# Patient Record
Sex: FEMALE | Race: BLACK OR AFRICAN AMERICAN | NOT HISPANIC OR LATINO | URBAN - METROPOLITAN AREA
[De-identification: names, ages, dates, MRNs, and addresses within clinical notes are randomized per-mention and may not be internally consistent; named-entity substitution may affect disease eponyms.]

---
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
## 2023-05-01 ENCOUNTER — OFFICE VISIT (OUTPATIENT)
Dept: OTOLARYNGOLOGY | Facility: CLINIC | Age: 18
End: 2023-05-01
Payer: COMMERCIAL

## 2023-05-01 ENCOUNTER — CLINICAL SUPPORT (OUTPATIENT)
Dept: AUDIOLOGY | Facility: CLINIC | Age: 18
End: 2023-05-01
Payer: COMMERCIAL

## 2023-05-01 DIAGNOSIS — H93.293 IMPAIRED AUDITORY DISCRIMINATION, BILATERAL: ICD-10-CM

## 2023-05-01 DIAGNOSIS — H90.3 SENSORINEURAL HEARING LOSS (SNHL) OF BOTH EARS: Primary | ICD-10-CM

## 2023-05-01 DIAGNOSIS — H91.8X3 ASYMMETRICAL HEARING LOSS: Primary | ICD-10-CM

## 2023-05-01 PROCEDURE — 99204 OFFICE O/P NEW MOD 45 MIN: CPT | Mod: S$GLB,,, | Performed by: OTOLARYNGOLOGY

## 2023-05-01 PROCEDURE — 99999 PR PBB SHADOW E&M-EST. PATIENT-LVL II: CPT | Mod: PBBFAC,,, | Performed by: OTOLARYNGOLOGY

## 2023-05-01 PROCEDURE — 99204 PR OFFICE/OUTPT VISIT, NEW, LEVL IV, 45-59 MIN: ICD-10-PCS | Mod: S$GLB,,, | Performed by: OTOLARYNGOLOGY

## 2023-05-01 PROCEDURE — 92604 REPROGRAM COCHLEAR IMPLT 7/>: CPT | Mod: S$GLB,,, | Performed by: AUDIOLOGIST

## 2023-05-01 PROCEDURE — 99999 PR PBB SHADOW E&M-EST. PATIENT-LVL I: CPT | Mod: PBBFAC,,, | Performed by: AUDIOLOGIST

## 2023-05-01 PROCEDURE — 92604 PR DX ANAL COCHLEAR IMP,PT >7 YRS,REPROG: ICD-10-PCS | Mod: S$GLB,,, | Performed by: AUDIOLOGIST

## 2023-05-01 PROCEDURE — 99999 PR PBB SHADOW E&M-EST. PATIENT-LVL I: ICD-10-PCS | Mod: PBBFAC,,, | Performed by: AUDIOLOGIST

## 2023-05-01 PROCEDURE — 99999 PR PBB SHADOW E&M-EST. PATIENT-LVL II: ICD-10-PCS | Mod: PBBFAC,,, | Performed by: OTOLARYNGOLOGY

## 2023-05-01 PROCEDURE — 1159F MED LIST DOCD IN RCRD: CPT | Mod: CPTII,S$GLB,, | Performed by: OTOLARYNGOLOGY

## 2023-05-01 PROCEDURE — 1159F PR MEDICATION LIST DOCUMENTED IN MEDICAL RECORD: ICD-10-PCS | Mod: CPTII,S$GLB,, | Performed by: OTOLARYNGOLOGY

## 2023-05-01 NOTE — PROGRESS NOTES
Subjective     Patient ID: Charis Crews is a 17 y.o. female.    Chief Complaint: Follow-up    HPI: Hx of Ron SNHL.    Has L CI done yrs ago. Has HA AD.    Needs some sort of disability documentation from ?? Me regarding her employability and ? Ability to support herself?    Juliet has already written one letter.    Past Medical History: Patient has a past medical history of Hearing loss.    Past Surgical History: Patient has a past surgical history that includes Bowel resection and Implantation of cochlear prosthesis (Left, 6/12/2018).    Social History: Patient reports that she is a non-smoker but has been exposed to tobacco smoke. She has never used smokeless tobacco. She reports that she does not drink alcohol and does not use drugs.    Family History: family history is not on file.    Medications:   Current Outpatient Medications   Medication Sig    clobetasol 0.05% (TEMOVATE) 0.05 % Oint Apply to site qhs. (Patient not taking: Reported on 5/1/2023)    HYDROcodone-acetaminophen (NORCO) 5-325 mg per tablet Take 1 tablet by mouth every 6 (six) hours as needed for Pain. (Patient not taking: Reported on 5/1/2023)    meclizine (ANTIVERT) 25 mg tablet Take 1 tablet (25 mg total) by mouth 3 (three) times daily as needed for Dizziness. (Patient not taking: Reported on 5/1/2023)    ondansetron (ZOFRAN-ODT) 8 MG TbDL Take 1 tablet (8 mg total) by mouth every 8 (eight) hours as needed (nausea). (Patient not taking: Reported on 5/1/2023)     No current facility-administered medications for this visit.       Allergies: Patient has No Known Allergies.    Review of Systems   Constitutional:  Negative for activity change, appetite change, chills, diaphoresis, fatigue, fever and unexpected weight change.   HENT:  Positive for hearing loss. Negative for nasal congestion, ear discharge, ear pain, facial swelling, nosebleeds, postnasal drip, rhinorrhea, sinus pressure/congestion, sneezing, sore throat, tinnitus, trouble  swallowing and voice change.    Eyes:  Negative for photophobia, pain, discharge, redness and visual disturbance.   Respiratory:  Negative for cough, chest tightness, shortness of breath and wheezing.    Cardiovascular:  Negative for chest pain and palpitations.   Gastrointestinal:  Negative for abdominal pain, constipation, diarrhea and nausea.   Genitourinary:  Negative for dysuria and frequency.   Musculoskeletal:  Negative for arthralgias, back pain, gait problem, joint swelling, myalgias, neck pain and neck stiffness.   Integumentary:  Negative for color change, pallor and rash.   Neurological:  Negative for dizziness, tremors, seizures, syncope, facial asymmetry, speech difficulty, weakness, light-headedness, numbness and headaches.   Hematological:  Negative for adenopathy. Does not bruise/bleed easily.   Psychiatric/Behavioral:  Negative for agitation, confusion, decreased concentration, dysphoric mood and sleep disturbance. The patient is not nervous/anxious and is not hyperactive.         Objective     Physical Exam  Vitals and nursing note reviewed.   Constitutional:       General: She is not in acute distress.     Appearance: Normal appearance. She is well-developed. She is not ill-appearing, toxic-appearing or diaphoretic.   HENT:      Head: Normocephalic and atraumatic. Not macrocephalic and not microcephalic. No raccoon eyes, Quiroz's sign, abrasion, contusion, right periorbital erythema, left periorbital erythema or laceration. Hair is normal.      Right Ear: Ear canal normal. Decreased hearing noted. No laceration, drainage, swelling or tenderness. No middle ear effusion. No foreign body. No mastoid tenderness. No hemotympanum. Tympanic membrane is not injected, scarred, perforated, erythematous, retracted or bulging. Tympanic membrane has normal mobility.      Left Ear: Ear canal normal. Decreased hearing noted. No laceration, drainage, swelling or tenderness.  No middle ear effusion. No foreign  body. No mastoid tenderness. No hemotympanum. Tympanic membrane is not injected, scarred, perforated, erythematous, retracted or bulging. Tympanic membrane has normal mobility.      Ears:      Comments:     L CI in place/ well healed.         Nose: No nasal deformity, septal deviation, laceration, mucosal edema or rhinorrhea.      Right Sinus: No maxillary sinus tenderness.      Left Sinus: No maxillary sinus tenderness.   Eyes:      General: Lids are normal.      Conjunctiva/sclera: Conjunctivae normal.      Pupils: Pupils are equal, round, and reactive to light.   Neck:      Thyroid: No thyroid mass or thyromegaly.      Vascular: No JVD.      Trachea: No tracheal tenderness or tracheal deviation.   Cardiovascular:      Rate and Rhythm: Normal rate and regular rhythm.   Pulmonary:      Effort: Pulmonary effort is normal. No tachypnea, bradypnea, accessory muscle usage or respiratory distress.      Breath sounds: No stridor.   Abdominal:      Palpations: Abdomen is soft.   Musculoskeletal:         General: Normal range of motion.      Cervical back: Normal range of motion and neck supple. No edema, erythema or rigidity. No muscular tenderness. Normal range of motion.   Lymphadenopathy:      Head:      Right side of head: No submental, submandibular, tonsillar, preauricular or posterior auricular adenopathy.      Left side of head: No submental, submandibular, tonsillar, preauricular or posterior auricular adenopathy.      Cervical: No cervical adenopathy.      Right cervical: No superficial, deep or posterior cervical adenopathy.     Left cervical: No superficial, deep or posterior cervical adenopathy.   Skin:     General: Skin is warm and dry.      Coloration: Skin is not pale.      Findings: No abrasion, bruising, burn, ecchymosis, erythema, laceration, lesion or rash.   Neurological:      Mental Status: She is alert and oriented to person, place, and time.      Cranial Nerves: No cranial nerve deficit.       Sensory: No sensory deficit.      Motor: No tremor, atrophy, abnormal muscle tone or seizure activity.   Psychiatric:         Speech: She is communicative. Speech is not rapid and pressured or slurred.         Behavior: Behavior normal. Behavior is cooperative.         Thought Content: Thought content normal.         Judgment: Judgment normal.          Assessment and Plan     Problem List Items Addressed This Visit    None  Visit Diagnoses       Asymmetrical hearing loss    -  Primary            To Audiology.    I am not trained in this sort of documentation/ analysis.     Would best be seen by [facility].

## 2023-05-02 NOTE — PROGRESS NOTES
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  Over the last year, mom reported a recent rapid decline in hearing.       Left Ear:  -  Cochlear  Internal Device/Serial Number- / [id number]  Processor- 2 N7s  DOS:  6/12/2018  DIS: 7/6/2018  Fitting Date- 7/6/2018  Processor Color-  Black  Magnet Strength- #6  Coil Length- 8 cm  Battery Type-  Std rech  Warranty-  7/6/2023    Charis Crews, 17 year old female, was seen for a cochlear implant follow-up appointment. Patient was accompanied to today's appointment by her mother. Patient reported she recently got a ReSound hearing aid for her right ear that needs to be paired to her processor. Patient also reported her sound processor has been cutting in and out causing sound to be intermittent. Patient reported she wears her hearing aid most of the time and only wears her sound processor when she leaves the house.     Sound processor was connected to the Custom Sound software and a software update was completed. It was noted that the connection between the processor and the implant was intermittent. Patient's cable coil was replaced with a new 8 cm coil and the processors microphone cover was replaced; connectivity issues were remedied. Impedances were measured for the left ear. It was noted that electrodes 1-9 were out of compliance. Pulse width was increased to 50. C levels were measured and set to "loud" overall. T levels were measured and dynamic range was increased from about 30 overall to 50 overall. Patient reported improved sound quality and improved perceived benefit. ReSound hearing aid for the right ear was paired with the left ear processor in the Custom Sound software.    Aided testing in sound field with the left cochlear implant sound processor revealed thresholds from 25-30 dB HL for 250-4000 Hz narrowband noise. " Aided speech reception threshold was obtained at 25 dB HL and word recognition score was 72% correct for the left ear. Aided testing in sound field with the right ReSound hearing aid at user settings revealed thresholds from 10-65 dB HL for 250-4000 Hz narrowband noise. Aided speech reception threshold was obtained at 35 dB HL and word recognition score was 32% correct for the right ear.     Patient reported good perceived benefit from bimodal amplification. Patient was able to stream bimodally from her phone prior to leaving today.    Recommend:  Consistent daily ise of cochlear implant sound processor and hearing aid  Annual evaluation  Contact Co[employer] to obtain needed supplies

## 2024-11-21 ENCOUNTER — CLINICAL SUPPORT (OUTPATIENT)
Dept: AUDIOLOGY | Facility: CLINIC | Age: 19
End: 2024-11-21
Payer: COMMERCIAL

## 2024-11-21 DIAGNOSIS — H93.293 IMPAIRED AUDITORY DISCRIMINATION, BILATERAL: Primary | ICD-10-CM

## 2024-11-21 DIAGNOSIS — H90.3 SENSORINEURAL HEARING LOSS (SNHL) OF BOTH EARS: ICD-10-CM

## 2024-11-21 PROCEDURE — 99999 PR PBB SHADOW E&M-EST. PATIENT-LVL I: CPT | Mod: PBBFAC,,,

## 2024-11-21 PROCEDURE — 92604 REPROGRAM COCHLEAR IMPLT 7/>: CPT | Mod: S$GLB,,,

## 2024-11-22 NOTE — PROGRESS NOTES
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  Over the last year, mom reported a recent rapid decline in hearing.       Left Ear:  -  Cochlear  Internal Device/Serial Number- / [id number]  Processor- 2 N7s  DOS:  6/12/2018  DIS: 7/6/2018  Fitting Date- 7/6/2018  Processor Color-  Black  Magnet Strength- #6  Coil Length- 8 cm  Battery Type-  Std rech  Warranty-  7/6/2023    Charis Crews was seen today for a follow up cochlear implant programming session, and she was accompanied to this appointment by her mother. Both of them reported that Charis has not been able to hear well from her processor, and that it has been intermittent. Charis wears a ReSound hearing aid in her right ear, and she expressed that it has not been working well either. The dome and wax filter were changed on the hearing aid, but the sound was still very weak. No other supplies of ReSounds were available in clinic, but it was recommended that she follow up with [facility] to see if they can change her  and decide if the hearing aid needs to be sent off for repair.     Impedance measurements were taken and within normal limits. Pulse width was changed to 72 due to compliance levels being met. C and T levels were increased 3 clinical units, and Charis reported that the sound quality was better but still seemed to be intermittent. It was recommended that Charis's mother reach out to [employer] to inquire about an upgrade due to poor sound quality and intermittency. Her mother was in agreement with this plan.     Aided testing revealed thresholds from 15-25 with the cochlear implant and 5-60 with all other no responses for the hearing aid ear. Aided speech recognition scores were obtained at 20 dB HL in the right ear and 20 dB HL in the left ear, and aided speech discriminations were 48% in  the right ear and 84% in the left ear.     Recommendations:  1) Call [facility] to discuss upgrade process  2) Follow up for programming sessions as needed  3) Annual audiogram, or sooner if any changes in hearing are noted

## 2024-12-06 ENCOUNTER — PATIENT MESSAGE (OUTPATIENT)
Dept: AUDIOLOGY | Facility: CLINIC | Age: 19
End: 2024-12-06
Payer: COMMERCIAL

## 2024-12-09 NOTE — TELEPHONE ENCOUNTER
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 She has called [facility] to start the replacement process.

## 2025-01-08 ENCOUNTER — CLINICAL SUPPORT (OUTPATIENT)
Dept: AUDIOLOGY | Facility: CLINIC | Age: 20
End: 2025-01-08
Payer: COMMERCIAL

## 2025-01-08 DIAGNOSIS — H93.293 IMPAIRED AUDITORY DISCRIMINATION, BILATERAL: Primary | ICD-10-CM

## 2025-01-08 DIAGNOSIS — H90.3 SENSORINEURAL HEARING LOSS (SNHL) OF BOTH EARS: ICD-10-CM

## 2025-01-08 PROCEDURE — 92604 REPROGRAM COCHLEAR IMPLT 7/>: CPT | Mod: S$GLB,,,

## 2025-01-09 NOTE — PROGRESS NOTES
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  Over the last year, mom reported a recent rapid decline in hearing.       Left Ear:  -  Cochlear  Internal Device/Serial Number- / [id number]  Processor- 2 N7s  DOS:  6/12/2018  DIS: 7/6/2018  Fitting Date- 7/6/2018  Processor Color-  Black  Magnet Strength- #6  Coil Length- 8 cm  Battery Type-  Std rech  Warranty-  7/6/2023     Charis Crews was seen today for a cochlear implant follow up appointment, and she was accompanied to this appointment by her mother. Her mother reported that Charis was sent an upgraded processor and that they needed the processor put together and programmed as well as the hearing aid connected to the new sound processor.     Impedance measurements were taken and within normal limits. Charis reported that the sound quality was excellent and much clearer than her older sound processor. She felt that no adjustments were needed. Her processor and hearing aid were paired together in the software.     The hearing aid and processor were connected to Charis's iPhone as well as the [employer] nathan.     Recommendations:  1) Follow up for programming sessions as needed  2) Annual audiogram, or sooner if any changes in hearing are noted

## 2025-06-09 ENCOUNTER — PATIENT MESSAGE (OUTPATIENT)
Dept: AUDIOLOGY | Facility: CLINIC | Age: 20
End: 2025-06-09
Payer: COMMERCIAL

## 2025-06-17 ENCOUNTER — TELEPHONE (OUTPATIENT)
Dept: AUDIOLOGY | Facility: CLINIC | Age: 20
End: 2025-06-17
Payer: COMMERCIAL

## 2025-06-19 ENCOUNTER — CLINICAL SUPPORT (OUTPATIENT)
Dept: AUDIOLOGY | Facility: CLINIC | Age: 20
End: 2025-06-19
Payer: COMMERCIAL

## 2025-06-19 DIAGNOSIS — H90.3 SENSORINEURAL HEARING LOSS (SNHL) OF BOTH EARS: ICD-10-CM

## 2025-06-19 DIAGNOSIS — H93.293 IMPAIRED AUDITORY DISCRIMINATION, BILATERAL: Primary | ICD-10-CM

## 2025-06-19 PROCEDURE — 99499 UNLISTED E&M SERVICE: CPT | Mod: S$GLB,,,

## (undated) DEVICE — KIT EAR EAOFE

## (undated) DEVICE — SEE MEDLINE ITEM 157128

## (undated) DEVICE — SEE MEDLINE ITEM 152622

## (undated) DEVICE — BIT DRILL TUBING

## (undated) DEVICE — ELECTRODE NDL

## (undated) DEVICE — DRESSING TELFA STRL 4X3 LF

## (undated) DEVICE — SOL IRR NACL .9% 3000ML

## (undated) DEVICE — COVERS PROBE NR-48 STERILE

## (undated) DEVICE — GAUZE FLUFF XXLG 36X36 2 PLY

## (undated) DEVICE — Device

## (undated) DEVICE — CORD BIPOLAR 12 FOOT

## (undated) DEVICE — SYR SLIP TIP 1CC

## (undated) DEVICE — FORCEP CURVED DISP

## (undated) DEVICE — SPONGE GAUZE 16PLY 4X4

## (undated) DEVICE — SUT BONE WAX 2.5 GRMS 12/BX

## (undated) DEVICE — SOL IRR WATER STRL 3000 ML

## (undated) DEVICE — BANDAGE KERLIX P/P 2.25IN STER

## (undated) DEVICE — BURR ROUND DIAMOND 1.5X48MM

## (undated) DEVICE — DRESSING GLASSCOCK PED MASTOID

## (undated) DEVICE — SUT 3/0 18IN COATED VICRYLP

## (undated) DEVICE — BURR CUTT CARB 3X38 E9000 FLUT

## (undated) DEVICE — SUCTION SURGICAL FRAZR

## (undated) DEVICE — CLOSURE SKIN STERI STRIP 1/2X4

## (undated) DEVICE — BURR DIAMOND FINE 1.0X48

## (undated) DEVICE — KIT SUCTION IRRIGATION

## (undated) DEVICE — CLIPPER BLADE MOD 4406 (CAREF)

## (undated) DEVICE — ELECTRODE REM PLYHSV RETURN 9

## (undated) DEVICE — BURR LSO ROUND FLUTED 5MM